# Patient Record
Sex: FEMALE | Race: WHITE | URBAN - METROPOLITAN AREA
[De-identification: names, ages, dates, MRNs, and addresses within clinical notes are randomized per-mention and may not be internally consistent; named-entity substitution may affect disease eponyms.]

---

## 2020-01-23 ENCOUNTER — TELEPHONE (OUTPATIENT)
Dept: GASTROENTEROLOGY | Facility: CLINIC | Age: 35
End: 2020-01-23

## 2020-01-23 NOTE — TELEPHONE ENCOUNTER
----- Message from Nunu Pisano RN sent at 2/17/2016  4:38 PM CST -----  Regarding: recall colon  Recall colon in 5 years per PL.  Colon done 2/23/15

## 2024-01-31 NOTE — LETTER
Caller: OLIVIER DIAS    Relationship to patient: RELATIVE    Best call back number: [phone]    Chief complaint: R/S     Type of visit: LAB AND FOLLOW UP 2    Requested date: CLOSER TO WARMER WEATHER, AND WILL NEED A WEDNESDAY IF POSSIBLE, PLEASE CALL THEM TO RESCHEDULE THIS.     If rescheduling, when is the original appointment: 2/29        1/23/2020    [address] [place] [address]., [address] [facility] [id number]            Dear Deshaun Lockett,      Our records indicate that you are due for an appointment for a Colonoscopy in February 2020, or shortly after, with Parris Gilliland MD.